# Patient Record
Sex: FEMALE | Race: WHITE | Employment: FULL TIME | URBAN - METROPOLITAN AREA
[De-identification: names, ages, dates, MRNs, and addresses within clinical notes are randomized per-mention and may not be internally consistent; named-entity substitution may affect disease eponyms.]

---

## 2022-12-28 ENCOUNTER — HOSPITAL ENCOUNTER (EMERGENCY)
Age: 47
Discharge: HOME | End: 2022-12-28
Payer: COMMERCIAL

## 2022-12-28 VITALS
TEMPERATURE: 98.1 F | DIASTOLIC BLOOD PRESSURE: 102 MMHG | SYSTOLIC BLOOD PRESSURE: 135 MMHG | RESPIRATION RATE: 20 BRPM | WEIGHT: 200 LBS | HEART RATE: 101 BPM | OXYGEN SATURATION: 98 %

## 2022-12-28 DIAGNOSIS — H66.90 ACUTE OTITIS MEDIA, UNSPECIFIED OTITIS MEDIA TYPE: ICD-10-CM

## 2022-12-28 DIAGNOSIS — J06.9 UPPER RESPIRATORY TRACT INFECTION, UNSPECIFIED TYPE: Primary | ICD-10-CM

## 2022-12-28 LAB
INFLUENZA A BY PCR: NOT DETECTED
INFLUENZA B BY PCR: NOT DETECTED

## 2022-12-28 PROCEDURE — 99211 OFF/OP EST MAY X REQ PHY/QHP: CPT

## 2022-12-28 PROCEDURE — 87502 INFLUENZA DNA AMP PROBE: CPT

## 2022-12-28 RX ORDER — BROMPHENIRAMINE MALEATE, PSEUDOEPHEDRINE HYDROCHLORIDE, AND DEXTROMETHORPHAN HYDROBROMIDE 2; 30; 10 MG/5ML; MG/5ML; MG/5ML
10 SYRUP ORAL 4 TIMES DAILY PRN
Qty: 120 ML | Refills: 0 | Status: SHIPPED | OUTPATIENT
Start: 2022-12-28 | End: 2023-01-04

## 2022-12-28 RX ORDER — AMOXICILLIN AND CLAVULANATE POTASSIUM 875; 125 MG/1; MG/1
1 TABLET, FILM COATED ORAL 2 TIMES DAILY
Qty: 20 TABLET | Refills: 0 | Status: SHIPPED | OUTPATIENT
Start: 2022-12-28 | End: 2023-01-07

## 2022-12-28 RX ORDER — LANOLIN ALCOHOL/MO/W.PET/CERES
3 CREAM (GRAM) TOPICAL DAILY
COMMUNITY

## 2022-12-28 ASSESSMENT — PAIN DESCRIPTION - LOCATION: LOCATION: HEAD

## 2022-12-28 ASSESSMENT — PAIN DESCRIPTION - DESCRIPTORS: DESCRIPTORS: PRESSURE;DULL;ACHING

## 2022-12-28 ASSESSMENT — PAIN SCALES - GENERAL: PAINLEVEL_OUTOF10: 5

## 2022-12-28 ASSESSMENT — PAIN - FUNCTIONAL ASSESSMENT: PAIN_FUNCTIONAL_ASSESSMENT: 0-10

## 2022-12-28 NOTE — ED PROVIDER NOTES
Airway patent. Neck/Lymphatics:  Neck Supple. Respiratory:   Breath sounds: bilateral normal.  Lung sounds: normal.   CV:  Regular rate and rhythm, normal heart sounds, without pathological murmurs, ectopy, gallops, or rubs. GI:  Abdomen Soft, nontender, good bowel sounds. No firm or pulsatile mass. Integument:  Normal turgor. Warm, dry, without visible rash. Neurological:  Oriented. Motor functions intact. Lab / Imaging Results   (All laboratory and radiology results have been personally reviewed by myself)  Labs:  Results for orders placed or performed during the hospital encounter of 12/28/22   Rapid influenza A/B antigens    Specimen: Nares   Result Value Ref Range    Influenza A by PCR Not Detected Not Detected    Influenza B by PCR Not Detected Not Detected     Imaging: All Radiology results interpreted by Radiologist unless otherwise noted. No orders to display     ED Course / Medical Decision Making   Medications - No data to display       MDM:   COVID and flu test were both negative she does have the otitis media and the sinus symptoms so I did start her on Augmentin and some Bromfed advised her to follow-up with her doctor if she has any worsening symptoms or no improvement she should get reevaluated  1. Upper respiratory tract infection, unspecified type    2.  Acute otitis media, unspecified otitis media type      Plan   Discharge to home and advised to contact call the referral line to get established with a Dr  [phone]  Schedule an appointment as soon as possible for a visit    Patient condition is good    New Medications     Discharge Medication List as of 12/28/2022  2:06 PM        START taking these medications    Details   amoxicillin-clavulanate (AUGMENTIN) 875-125 MG per tablet Take 1 tablet by mouth 2 times daily for 10 days, Disp-20 tablet, R-0Normal      brompheniramine-pseudoephedrine-DM 2-30-10 MG/5ML syrup Take 10 mLs by mouth 4 times daily as needed for Congestion or Cough, Disp-120 mL, R-0Normal           Electronically signed by TON Stauffer CNP   DD: 12/28/22  **This report was transcribed using voice recognition software. Every effort was made to ensure accuracy; however, inadvertent computerized transcription errors may be present.   END OF ED PROVIDER NOTE      TON Stauffer CNP  12/28/22 2031

## 2023-07-12 ENCOUNTER — TELEPHONE (OUTPATIENT)
Dept: BREAST CENTER | Age: 48
End: 2023-07-12

## 2023-07-12 NOTE — TELEPHONE ENCOUNTER
RN contacted patient in regards to referral from [facility] for abnormal imaging right breast mass. RN reviewed patient imaging and scanned in patient referral and external imaging. RN spoke to patient in regards to recommendations and the way the referral will move forward. RN explained bx is recommended and since her imaging was done @ [address] we would need her to get last 3 years worth of breast mammograms and ultrasounds placed on disc. Patient stated this was her first set of breast imaging ever. RN verbalized understanding. Once she has disc bring to office and we will have it uploaded into her chart for our radiologist to review and give us their recommendations. Patient verbalized understanding. RN advised once she drops the disc off if she can contact office just to notify us so we can make sure we get it. Patient verbalized understanding. RN advised once radiologist reviews imaging we will contact her in regards to recommendations and steps moving forward. Patient verbalized understanding. Patient denied use of ASA or blood thinners.         Electronically signed by Ely Wyatt RN on 7/12/23 at 9:23 AM EDT

## 2023-07-13 NOTE — TELEPHONE ENCOUNTER
Patient called to notify office that she dropped disc off at the . RN verbalized understanding and took disc to techs.        Electronically signed by Tom Cazares RN on 7/13/23 at 12:00 PM EDT

## 2023-07-19 ENCOUNTER — TELEPHONE (OUTPATIENT)
Dept: BREAST CENTER | Age: 48
End: 2023-07-19

## 2023-07-19 DIAGNOSIS — N64.59 ABNORMAL BREAST FINDING: Primary | ICD-10-CM

## 2023-07-19 NOTE — TELEPHONE ENCOUNTER
Discussed film consult recommendations made by our radiologist. She has been recommended a repeat right breast ultrasound, possible right diagnostic mammogram and possible right ultrasound guided biopsy to follow. She agrees with proceeding then following up after. No blood thinners. Orders placed and taken to the schedulers.

## 2023-07-28 ENCOUNTER — HOSPITAL ENCOUNTER (OUTPATIENT)
Dept: GENERAL RADIOLOGY | Age: 48
End: 2023-07-28
Attending: SURGERY
Payer: COMMERCIAL

## 2023-07-28 VITALS — WEIGHT: 200 LBS | HEIGHT: 65 IN | BODY MASS INDEX: 33.32 KG/M2

## 2023-07-28 DIAGNOSIS — N64.59 ABNORMAL BREAST FINDING: ICD-10-CM

## 2023-07-28 PROCEDURE — 76642 ULTRASOUND BREAST LIMITED: CPT

## 2023-07-28 PROCEDURE — 77065 DX MAMMO INCL CAD UNI: CPT

## 2023-08-02 ENCOUNTER — TELEPHONE (OUTPATIENT)
Dept: BREAST CENTER | Age: 48
End: 2023-08-02

## 2023-08-02 NOTE — TELEPHONE ENCOUNTER
Patient strongly recommended a clinical follow up with our office for her breast symptoms after recent imaging. Patient declined a consultation and would like to follow up with the referring provider, Dr. Ramya Tubbs. Referring provider notified of this update and will forward patient's breast imaging results for their records.    Patient has a repeat right diagnostic mammogram in January as recommended by radiologist.    Electronically signed by Claire Pugh RN on 8/2/23 at 8:53 AM EDT

## 2024-01-29 ENCOUNTER — HOSPITAL ENCOUNTER (OUTPATIENT)
Dept: GENERAL RADIOLOGY | Age: 49
Discharge: HOME | End: 2024-01-31
Payer: COMMERCIAL

## 2024-01-29 VITALS — WEIGHT: 200 LBS | BODY MASS INDEX: 33.32 KG/M2 | HEIGHT: 65 IN

## 2024-01-29 DIAGNOSIS — N64.59 ABNORMAL BREAST FINDING: ICD-10-CM

## 2024-01-29 PROCEDURE — 76642 ULTRASOUND BREAST LIMITED: CPT

## 2024-01-29 PROCEDURE — G0279 TOMOSYNTHESIS, MAMMO: HCPCS

## 2024-09-05 ENCOUNTER — TELEPHONE (OUTPATIENT)
Dept: BREAST CENTER | Age: 49
End: 2024-09-05

## 2024-09-05 NOTE — TELEPHONE ENCOUNTER
Called and spoke with patient. she is anew referral for abnormal mammogram with recommendation for cyst aspiration versus biopsy. She has some of her imaging already here at [facility] as she was a referral last year but declined a consultation. Patient understands to bring her most recent imaging disc and once our Radiologist reviews it, we will call her with the recommendations. patient agreeable.    Electronically signed by Katherine Lopez RN on 9/5/24 at 9:59 AM EDT    Electronically signed by Katherine Lopez RN on 9/5/24 at 10:01 AM EDT

## 2024-09-19 ENCOUNTER — TELEPHONE (OUTPATIENT)
Dept: BREAST CENTER | Age: 49
End: 2024-09-19

## 2024-09-23 ENCOUNTER — OFFICE VISIT (OUTPATIENT)
Dept: BREAST CENTER | Age: 49
End: 2024-09-23
Payer: COMMERCIAL

## 2024-09-23 ENCOUNTER — HOSPITAL ENCOUNTER (OUTPATIENT)
Dept: GENERAL RADIOLOGY | Age: 49
Discharge: HOME | End: 2024-09-25
Attending: STUDENT IN AN ORGANIZED HEALTH CARE EDUCATION/TRAINING PROGRAM

## 2024-09-23 VITALS
BODY MASS INDEX: 34.82 KG/M2 | HEART RATE: 78 BPM | TEMPERATURE: 98.8 F | WEIGHT: 209 LBS | SYSTOLIC BLOOD PRESSURE: 138 MMHG | DIASTOLIC BLOOD PRESSURE: 86 MMHG | HEIGHT: 65 IN | RESPIRATION RATE: 14 BRPM | OXYGEN SATURATION: 99 %

## 2024-09-23 DIAGNOSIS — N63.11 MASS OF UPPER OUTER QUADRANT OF RIGHT BREAST: ICD-10-CM

## 2024-09-23 DIAGNOSIS — R92.8 ABNORMAL MAMMOGRAM OF RIGHT BREAST: Primary | ICD-10-CM

## 2024-09-23 DIAGNOSIS — N63.15 MASS OVERLAPPING MULTIPLE QUADRANTS OF RIGHT BREAST: ICD-10-CM

## 2024-09-23 PROCEDURE — 99204 OFFICE O/P NEW MOD 45 MIN: CPT | Performed by: STUDENT IN AN ORGANIZED HEALTH CARE EDUCATION/TRAINING PROGRAM

## 2024-09-23 PROCEDURE — 99203 OFFICE O/P NEW LOW 30 MIN: CPT | Performed by: STUDENT IN AN ORGANIZED HEALTH CARE EDUCATION/TRAINING PROGRAM

## 2024-09-25 ENCOUNTER — TELEPHONE (OUTPATIENT)
Dept: SURGERY | Age: 49
End: 2024-09-25

## 2024-09-25 DIAGNOSIS — N63.11 MASS OF UPPER OUTER QUADRANT OF RIGHT BREAST: Primary | ICD-10-CM

## 2024-10-02 ENCOUNTER — HOSPITAL ENCOUNTER (OUTPATIENT)
Dept: GENERAL RADIOLOGY | Age: 49
Discharge: HOME | End: 2024-10-04
Attending: STUDENT IN AN ORGANIZED HEALTH CARE EDUCATION/TRAINING PROGRAM
Payer: COMMERCIAL

## 2024-10-02 DIAGNOSIS — N63.11 MASS OF UPPER OUTER QUADRANT OF RIGHT BREAST: ICD-10-CM

## 2024-10-02 PROCEDURE — 76642 ULTRASOUND BREAST LIMITED: CPT

## 2024-10-02 PROCEDURE — 77065 DX MAMMO INCL CAD UNI: CPT

## 2024-10-02 PROCEDURE — 19083 BX BREAST 1ST LESION US IMAG: CPT

## 2024-10-07 LAB — SURGICAL PATHOLOGY REPORT: NORMAL

## 2024-10-08 ENCOUNTER — TELEPHONE (OUTPATIENT)
Dept: SURGERY | Age: 49
End: 2024-10-08

## 2024-10-08 DIAGNOSIS — Z12.31 ENCOUNTER FOR SCREENING MAMMOGRAM FOR BREAST CANCER: Primary | ICD-10-CM

## 2024-10-08 NOTE — TELEPHONE ENCOUNTER
Discussed patient's right breast biopsy with her over the phone:     -- Diagnosis --   Right breast 10:00, core biopsy: Cystic change, no evidence of   neoplasm.       Lee Ch MD   **Electronically Signed Out**         grr/10/7/2024     Pathology is concordant with imaging findings.     Will have her return to her annual screening which is due in July 2025 and will have her follow up with me at that time as well.     Nicole Reyes, DO

## 2025-07-31 ENCOUNTER — TELEPHONE (OUTPATIENT)
Age: 50
End: 2025-07-31

## 2025-07-31 NOTE — TELEPHONE ENCOUNTER
RN received call from patient stating that she has recently had a hysterectomy and will need to reschedule her upcoming appointment with Dr. Reyes. Patient has been rescheduled for 9/15/25.     Electronically signed by Patricia Ruiz RN on 7/31/25 at 10:31 AM EDT